# Patient Record
Sex: FEMALE
[De-identification: names, ages, dates, MRNs, and addresses within clinical notes are randomized per-mention and may not be internally consistent; named-entity substitution may affect disease eponyms.]

---

## 2017-05-29 ENCOUNTER — HOSPITAL ENCOUNTER (EMERGENCY)
Dept: HOSPITAL 42 - ED | Age: 62
Discharge: HOME | End: 2017-05-29
Payer: MEDICARE

## 2017-05-29 VITALS — TEMPERATURE: 98.6 F | OXYGEN SATURATION: 100 % | RESPIRATION RATE: 16 BRPM

## 2017-05-29 VITALS — SYSTOLIC BLOOD PRESSURE: 121 MMHG | HEART RATE: 80 BPM | DIASTOLIC BLOOD PRESSURE: 52 MMHG

## 2017-05-29 VITALS — BODY MASS INDEX: 27.3 KG/M2

## 2017-05-29 DIAGNOSIS — B02.9: Primary | ICD-10-CM

## 2017-05-29 PROCEDURE — 99283 EMERGENCY DEPT VISIT LOW MDM: CPT

## 2017-05-29 PROCEDURE — 96372 THER/PROPH/DIAG INJ SC/IM: CPT

## 2017-05-29 NOTE — ED PDOC
Arrival/HPI





- General


Chief Complaint: Abnormal Skin Integrity


Time Seen by Provider: 05/29/17 09:17





- History of Present Illness


Narrative History of Present Illness (Text): 





Soraya Doe is a 61 year old female, whose past medical history includes 

chicken pox, who presents to the emergency department complaining of a rash to 

her right-sided back and lower right-sided abdomen for two days. Patient states 

that the pain came first with associated fever which has resolved. Patient 

denies any chills, chest pain, shortness of breath, nausea, vomiting, diarrhea, 

urinary symptoms, back pain, neck pain, headache, dizziness, or any other 

complaints. 





PMD: Dr. Souza





Time/Duration: 24 hours


Symptom Onset: Gradual


Symptom Course: Unchanged


Severity Level: Mild


Activities at Onset: Light


Context: Home





Past Medical History





- Provider Review


Nursing Documentation Reviewed: Yes





- Psychiatric


Hx Substance Use: No





Family/Social History





- Physician Review


Nursing Documentation Reviewed: Yes


Family/Social History: No Known Family HX


Smoking Status: Never Smoked


Hx Alcohol Use: No


Hx Substance Use: No





Allergies/Home Meds


Allergies/Adverse Reactions: 


Allergies





celecoxib [From Celebrex] Allergy (Verified 02/21/16 07:00)


 RASH


levofloxacin [From Levaquin] Allergy (Verified 05/29/17 09:08)


 RASH








Home Medications: 


 Home Meds











 Medication  Instructions  Recorded  Confirmed


 


Aspirin [Adult Low Dose Aspirin EC] 81 mg PO DAILY 05/29/17 05/29/17


 


Atorvastatin [Lipitor] 20 mg PO DAILY 05/29/17 05/29/17


 


Fluticasone/Vilanterol [Breo 1 each IH DAILY 05/29/17 05/29/17





Ellipta 200-25 Mcg INH]   


 


Losartan Potassium 25 mg PO DAILY 05/29/17 05/29/17


 


Montelukast Sodium [Singulair] 4 mg PO DAILY 05/29/17 05/29/17














Review of Systems





- Physician Review


All systems were reviewed & negative as marked: Yes





- Review of Systems


Constitutional: absent: Fevers, Night Sweats


Eyes: absent: Vision Changes


ENT: absent: Hearing Changes


Respiratory: absent: SOB, Cough


Cardiovascular: absent: Chest Pain


Gastrointestinal: absent: Abdominal Pain


Genitourinary Female: absent: Urine Output Changes


Musculoskeletal: absent: Back Pain, Neck Pain


Skin: Rash


Neurological: absent: Headache, Dizziness


Endocrine: absent: Polyuria


Hemo/Lymphatic: absent: Easy Bleeding


Psychiatric: absent: Depression





Physical Exam





- Physical Exam


Narrative Physical Exam (Text): 


Constitutional: No acute distress.


Head: Normocephalic.  Atraumatic.  


Eyes:  PERRL.


ENT:  Moist mucous membranes.


Neck:  Supple.


Cardiovascular:  Regular rate.


Chest: No tenderness.


Respiratory:  Clear to auscultation bilaterally.


GI:  Soft. Nontender. Nondistended. 


Back:  No CVA tenderness.


Musculoskeletal:  No tenderness or swelling of extremities.


Skin: Vesicular clustered, affecting one dermatome. No lesions on face, tip of 

nose, or ears. 


Neurologic:  Alert, no focal deficit.











Vital Signs Reviewed: Yes


Vital Signs











  Temp Pulse Resp BP Pulse Ox


 


 05/29/17 09:40   80  16  121/52 L  100


 


 05/29/17 09:08  98.6 F  82  16  129/89  100











Temperature: Afebrile


Blood Pressure: Normal


Pulse: Regular


Respiratory Rate: Normal


Appearance: Positive for: Well-Appearing, Non-Toxic, Comfortable


Pain Distress: None


Mental Status: Positive for: Alert and Oriented X 3





Medical Decision Making


ED Course and Treatment: 


Impression:





61 year old female complaining of rash on right-sided back and right-sided 

abdomen for two days.





Plan:


-- Toradol and Valtrex


-- Reassess and disposition





Prior Visits:


Notes and results from previous visits were reviewed. Patient last seen in ED 

on 02/21/16 for cough and difficulty breathing for 2 days. Patient was 

discharged home.





Progress Notes:


Discharged home, f/u PMD, instructed to return to ER for new lesions in other 

area of body, eye or nose lesions.





- Medication Orders


Current Medication Orders: 











Discontinued Medications





Ketorolac Tromethamine (Toradol)  30 mg IM STAT STA


   Stop: 05/29/17 09:19


   Last Admin: 05/29/17 09:38  Dose: 30 mg





Valacyclovir HCl (Valtrex)  1 gm PO STAT STA


   PRN Reason: Protocol


   Stop: 05/29/17 09:18


   Last Admin: 05/29/17 09:39  Dose: 1 gm











- Scribe Statement


The provider has reviewed the documentation as recorded by the Kaitibcarmelita Lui





Provider Scribe Attestation:


All medical record entries made by the Scribe were at my direction and 

personally dictated by me. I have reviewed the chart and agree that the record 

accurately reflects my personal performance of the history, physical exam, 

medical decision making, and the department course for this patient. I have 

also personally directed, reviewed, and agree with the discharge instructions 

and disposition.





Disposition/Present on Arrival





- Present on Arrival


Any Indicators Present on Arrival: No


History of DVT/PE: No


History of Uncontrolled Diabetes: No


Urinary Catheter: No


History of Decub. Ulcer: No


History Surgical Site Infection Following: None





- Disposition


Have Diagnosis and Disposition been Completed?: Yes


Diagnosis: 


 Shingles





Disposition: HOME/ ROUTINE


Disposition Time: 09:19


Patient Plan: Discharge


Condition: STABLE


Discharge Instructions (ExitCare):  Shingles (ED)


Prescriptions: 


Ibuprofen [Motrin] 600 mg PO Q6 #25 tab


valACYclovir [Valtrex] 1 gm PO Q8H #14 tab


Referrals: 


Ragini Souza MD [Primary Care Provider] - Follow up with primary

## 2018-11-01 ENCOUNTER — HOSPITAL ENCOUNTER (EMERGENCY)
Dept: HOSPITAL 42 - ED | Age: 63
Discharge: HOME | End: 2018-11-01
Payer: MEDICARE

## 2018-11-01 VITALS
OXYGEN SATURATION: 99 % | TEMPERATURE: 98 F | HEART RATE: 80 BPM | RESPIRATION RATE: 19 BRPM | DIASTOLIC BLOOD PRESSURE: 88 MMHG | SYSTOLIC BLOOD PRESSURE: 159 MMHG

## 2018-11-01 VITALS — BODY MASS INDEX: 28.3 KG/M2

## 2018-11-01 DIAGNOSIS — M79.18: Primary | ICD-10-CM

## 2018-11-01 NOTE — ED PDOC
Arrival/HPI





- General


Time Seen by Provider: 11/01/18 08:46


Historian: Patient





- History of Present Illness


Narrative History of Present Illness (Text): 





11/01/18 09:00


63 year old female, with no significant past medical history, presents to the 

emergency department complaining of neck discomfort that began 1-2 weeks ago. 

She reports her neck is stiff and is unable to turn her head. Patient reports 

she saw her PMD who prescribed Naproxen and Meloxicam with no relief. Patient 

denies any fall or trauma. She states she was doing some heavy lifting at home. 

Patient denies any fever, chills, chest pain, shortness of breath, nausea, 

vomiting, diarrhea, back pain, headache, dizziness, or any other complaints.





PMD: Dr. Ragini Souza 





Time/Duration: Other (1- 2 weeks)


Symptom Course: Unchanged


Quality: Other (Stiff)


Activities at Onset: Light


Context: Home





Past Medical History





- Provider Review


Nursing Documentation Reviewed: Yes





- Psychiatric


Hx Substance Use: No





Family/Social History





- Physician Review


Nursing Documentation Reviewed: Yes


Family/Social History: No Known Family HX


Smoking Status: Never Smoked


Hx Alcohol Use: No


Hx Substance Use: No





Allergies/Home Meds


Allergies/Adverse Reactions: 


Allergies





celecoxib [From Celebrex] Allergy (Verified 02/21/16 07:00)


   RASH


levofloxacin [From Levaquin] Allergy (Verified 05/29/17 09:08)


   RASH








Home Medications: 


                                    Home Meds











 Medication  Instructions  Recorded  Confirmed


 


Aspirin [Adult Low Dose Aspirin EC] 81 mg PO DAILY 05/29/17 05/29/17


 


Atorvastatin [Lipitor] 20 mg PO DAILY 05/29/17 05/29/17


 


Fluticasone/Vilanterol [Breo 1 each IH DAILY 05/29/17 05/29/17





Ellipta 200-25 Mcg INH]   


 


Losartan Potassium 25 mg PO DAILY 05/29/17 05/29/17


 


Montelukast Sodium [Singulair] 4 mg PO DAILY 05/29/17 05/29/17














Review of Systems





- Physician Review


All systems were reviewed & negative as marked: Yes





- Review of Systems


Constitutional: absent: Fevers, Other (Chills)


Respiratory: absent: SOB


Cardiovascular: absent: Chest Pain


Gastrointestinal: absent: Diarrhea, Nausea, Vomiting


Musculoskeletal: Neck Pain.  absent: Back Pain


Neurological: absent: Headache, Dizziness





Physical Exam


Vital Signs Reviewed: Yes


Temperature: Afebrile


Blood Pressure: Hypertensive


Pulse: Regular


Respiratory Rate: Normal


Appearance: Positive for: Well-Appearing, Non-Toxic, Comfortable


Pain Distress: None


Mental Status: Positive for: Alert and Oriented X 3





- Systems Exam


Head: Present: Atraumatic, Normocephalic


Pupils: Present: PERRL


Extroacular Muscles: Present: EOMI


Conjunctiva: Present: Normal


Mouth: Present: Moist Mucous Membranes


Neck: Present: Other (Hypertenisity and tenderness to the right trapezius)


Respiratory/Chest: Present: Clear to Auscultation, Good Air Exchange.  No: 

Respiratory Distress, Accessory Muscle Use


Cardiovascular: Present: Regular Rate and Rhythm, Normal S1, S2.  No: Murmurs


Back: Present: Normal Inspection


Lower Extremity: No: Normal Inspection


Neurological: Present: GCS=15, CN II-XII Intact, Speech Normal


Skin: Present: Warm, Dry, Normal Color.  No: Rashes


Psychiatric: Present: Alert, Oriented x 3, Normal Insight, Normal Concentration





Medical Decision Making


ED Course and Treatment: 





11/01/18 09:00


Impression:


63 year old female presents complaining of neck discomfort for the past 1-2 

weeks. 








Plan:


-- Flexeril, Motrin 


-- Reassess and disposition








Progress Notes:








11/01/18 09:15


On re-evaluation, patient feels better and is in no acute distress. I have 

discussed the results and plan with the patient, who expresses understanding. 

Patient in agreement with plan to be discharged home. Patient is stable for 

discharge. Patient was instructed to follow up with physician or return if 

symptoms worsen or new concerning symptoms arise. 





- Medication Orders


Current Medication Orders: 











Ibuprofen (Motrin Tab)  600 mg PO STAT STA


   Stop: 11/01/18 09:05





Discontinued Medications





Cyclobenzaprine HCl (Flexeril)  10 mg PO STAT STA


   Stop: 11/01/18 09:05











- Scribe Statement


The provider has reviewed the documentation as recorded by the Claudine Perla





Provider Scribe Attestation:


All medical record entries made by the Scribe were at my direction and 

personally dictated by me. I have reviewed the chart and agree that the record 

accurately reflects my personal performance of the history, physical exam, 

medical decision making, and the department course for this patient. I have also

 personally directed, reviewed, and agree with the discharge instructions and 

disposition.








Disposition/Present on Arrival





- Present on Arrival


Any Indicators Present on Arrival: No


History of DVT/PE: No


History of Uncontrolled Diabetes: No


Urinary Catheter: No


History Surgical Site Infection Following: None





- Disposition


Have Diagnosis and Disposition been Completed?: Yes


Diagnosis: 


 Musculoskeletal pain





Disposition: HOSPITALIZED


Disposition Time: 09:00


Condition: GOOD


Discharge Instructions (ExitCare):  Muscle and Bone Pain (DC)


Additional Instructions: 





GERARDO PADILLA, thank you for letting us take care of you today. Your provider

 was Martin Paz DO and you were treated for NECK AND BACK PAIN. The 

emergency medical care you received today was directed at your acute symptoms. 

If you were prescribed any medication, please fill it and take as directed. It 

may take several days for your symptoms to resolve. Return to the Emergency 

Department if your symptoms worsen, do not improve, or if you have any other 

problems.





Please contact your doctor or call one of the physicians/clinics you have been 

referred to that are listed on the Patient Visit Information form that is 

included in your discharge packet. Bring any paperwork you were given at 

discharge with you along with any medications you are taking to your follow up 

visit. Our treatment cannot replace ongoing medical care by a primary care 

provider outside of the emergency department.





Thank you for allowing the [facility] team to be part of your care today.














You can take the Flexeril along with the Naprosyn for added relief.








You can apply heat or ice to the area for added relief.








Followup with your primary care doctor in 3-4 days for re-evaluation and further

 management.


Prescriptions: 


Cyclobenzaprine [Cyclobenzaprine HCl] 10 mg PO Q8 PRN #20 tab


 PRN Reason: Muscle Spasm


Referrals: 


Ragini Souza MD [Primary Care Provider] - Follow up with primary


Forms:  [employer] (English)